# Patient Record
(demographics unavailable — no encounter records)

---

## 2024-11-20 NOTE — HISTORY OF PRESENT ILLNESS
[de-identified] : Ms. NICOLE BONNER is a 56 year old woman presents today for post-op appointment s/p right reverse total shoulder arthroplasty on 11/3/24. Since her surgery she states she is feeling

## 2024-11-20 NOTE — DISCUSSION/SUMMARY
[de-identified] : 56-year-old woman s/p right reverse total shoulder arthroplasty, approximately 2.5 weeks out.  -X-ray and physical exam findings were discussed with the patient -WES right UE -Sling for comfort -Physical therapy: as per protocol  -Follow up in 4 weeks with x-rays of the right shoulder at that time. -All the patient's questions and concerns were addressed during this visit

## 2024-11-20 NOTE — DISCUSSION/SUMMARY
[de-identified] : 56-year-old woman s/p right reverse total shoulder arthroplasty, approximately 2.5 weeks out.  -X-ray and physical exam findings were discussed with the patient -WES right UE -Sling for comfort -Physical therapy: as per protocol  -Follow up in 4 weeks with x-rays of the right shoulder at that time. -All the patient's questions and concerns were addressed during this visit

## 2024-11-20 NOTE — PHYSICAL EXAM
[de-identified] : Ms. NICOLE BONNER is sitting comfortably in the exam room  RIGHT shoulder -Skin is intact, no swelling, no ecchymosis -Incision is clean and dry, no erythema, no signs of infection -FE: , ER: , IR: , ABD: -Able to make a fist -Sensation is intact median, radial, ulnar, axillary nerves -Motor is intact median, radial, ulnar, axillary nerves -Hand is warm and well-perfused, Palpable radial and ulnar pulses  [de-identified] :  X-rays of the right shoulder were taken in the office today, 11/21/24. AP, Scap Y, Axillary.

## 2024-11-20 NOTE — REASON FOR VISIT
[Post Operative Visit] : a post operative visit for [FreeTextEntry2] : s/p Right shoulder reverse arthroplasty, DOS: 11/3/24

## 2024-11-20 NOTE — HISTORY OF PRESENT ILLNESS
[de-identified] : Ms. NICOLE BONNER is a 56 year old woman presents today for post-op appointment s/p right reverse total shoulder arthroplasty on 11/3/24. Since her surgery she states she is feeling

## 2024-11-20 NOTE — PHYSICAL EXAM
[de-identified] : Ms. NICOLE BONNER is sitting comfortably in the exam room  RIGHT shoulder -Skin is intact, no swelling, no ecchymosis -Incision is clean and dry, no erythema, no signs of infection -FE: , ER: , IR: , ABD: -Able to make a fist -Sensation is intact median, radial, ulnar, axillary nerves -Motor is intact median, radial, ulnar, axillary nerves -Hand is warm and well-perfused, Palpable radial and ulnar pulses  [de-identified] :  X-rays of the right shoulder were taken in the office today, 11/21/24. AP, Scap Y, Axillary.

## 2025-01-02 NOTE — HISTORY OF PRESENT ILLNESS
[de-identified] : Ms. NICOLE BONNER is a 56 year old woman presents today for post-op appointment s/p right reverse total shoulder arthroplasty on 11/3/24. Since her last visit she states she is feeling better. She has been participating in PT twice a week.

## 2025-01-02 NOTE — PHYSICAL EXAM
[de-identified] : Ms. NICOLE BONNER is sitting comfortably in the exam room  RIGHT shoulder -Skin is intact, no swelling,no ecchymosis -Incision is well-healed, no erythema, no signs of infection -Passive FE:60 , ER:5 , IR: Hip , Passive ABD: 30 -Able to make a fist -Sensation is grossly intact median, radial, ulnar, axillary nerves -Motor is intact median, radial, ulnar, axillary nerves -Hand is warm and well-perfused, Palpable radial and ulnar pulses  [de-identified] :  X-rays of the right shoulder were taken in the office today, 1/2/25. AP, Scap Y, Axillary.

## 2025-01-02 NOTE — DISCUSSION/SUMMARY
[de-identified] : 56-year-old woman s/p right reverse total shoulder arthroplasty, approximately 8.5 weeks out.  -X-ray and physical exam findings were discussed with the patient -NWB right UE -Physical therapy: as per protocol  -Mobic, Flexeril and Gabapentin for pain  -EMG to assess nerve conduction -Follow up in 6 weeks with x-rays of the right shoulder at that time. -All the patient's questions and concerns were addressed during this visit

## 2025-02-13 NOTE — HISTORY OF PRESENT ILLNESS
[de-identified] : Ms. NICOLE BONNER is a 56 year old woman presents today for follow up appointment s/p right reverse total shoulder arthroplasty on 11/3/24 after a right shoulder fracture dislocation with a brachial plexopathy. Since her last visit she states she is feeling the same. She notes pain at the right shoulder and a burning sensation to the right hand and lateral aspect of arm. She is taking Mobic, Tylenol and Gabapentin for the pain with mild relief.

## 2025-02-13 NOTE — REASON FOR VISIT
[Follow-Up Visit] : a follow-up visit for [FreeTextEntry2] : s/p Right shoulder reverse arthroplasty, DOS: 11/3/24.

## 2025-02-13 NOTE — PHYSICAL EXAM
[de-identified] : Ms. NICOLE BONNER is sitting comfortably in the exam room RIGHT shoulder -Skin is intact, there is atrophy of the deltoid compared to the other side, no ecchymosis -Incision is well-healed, no erythema, no signs of infection -Passive FE: 110 , ER:10 , IR: Hip , ABD: 90 -Able to make a fist -Sensation is grossly intact median, radial, ulnar, axillary nerves -Motor is intact median, radial, ulnar, axillary nerves -Hand is warm and well-perfused, Palpable radial and ulnar pulses [de-identified] : X-rays of the right shoulder were taken in the office today, 2/13/25. AP, Scap Y, Axillary. X-rays show good overall alignment of the shoulder. The patient is status post reverse total shoulder arthroplasty. Implants are in good position. No lucencies around the screws.

## 2025-02-13 NOTE — HISTORY OF PRESENT ILLNESS
[de-identified] : Ms. NICOLE BONNER is a 56 year old woman presents today for follow up appointment s/p right reverse total shoulder arthroplasty on 11/3/24 after a right shoulder fracture dislocation with a brachial plexopathy. Since her last visit she states she is feeling the same. She notes pain at the right shoulder and a burning sensation to the right hand and lateral aspect of arm. She is taking Mobic, Tylenol and Gabapentin for the pain with mild relief.

## 2025-02-13 NOTE — PHYSICAL EXAM
[de-identified] : Ms. NICOLE BONNER is sitting comfortably in the exam room RIGHT shoulder -Skin is intact, there is atrophy of the deltoid compared to the other side, no ecchymosis -Incision is well-healed, no erythema, no signs of infection -Passive FE: 110 , ER:10 , IR: Hip , ABD: 90 -Able to make a fist -Sensation is grossly intact median, radial, ulnar, axillary nerves -Motor is intact median, radial, ulnar, axillary nerves -Hand is warm and well-perfused, Palpable radial and ulnar pulses [de-identified] : X-rays of the right shoulder were taken in the office today, 2/13/25. AP, Scap Y, Axillary. X-rays show good overall alignment of the shoulder. The patient is status post reverse total shoulder arthroplasty. Implants are in good position. No lucencies around the screws.

## 2025-02-13 NOTE — DISCUSSION/SUMMARY
[de-identified] : 56-year-old woman s/p right reverse total shoulder arthroplasty, approximately 3 months out  -X-ray and physical exam findings were discussed with the patient.  The patient's initial injury involved the humeral head dislocating medially and anteriorly into the axilla around the brachial plexus.  The patient had some numbness and tingling initially throughout the hand.  She now has some neuropathic pain which could potentially be a sign of the nerve recovering.  She also has some atrophy at the deltoid.   -It is absolutely imperative that we get an EMG to assess the brachial plexus recovery and function of the patient's arm and shoulder.  -Getting physical therapy for the patient is absolutely necessary to optimize her recovery as well. -WBAT right UE -Physical therapy: as per protocol -Mobic, Flexeril and Gabapentin for pain -Follow up after the EMG -All the patient's questions and concerns were addressed during this visit.

## 2025-02-13 NOTE — DISCUSSION/SUMMARY
[de-identified] : 56-year-old woman s/p right reverse total shoulder arthroplasty, approximately 3 months out  -X-ray and physical exam findings were discussed with the patient.  The patient's initial injury involved the humeral head dislocating medially and anteriorly into the axilla around the brachial plexus.  The patient had some numbness and tingling initially throughout the hand.  She now has some neuropathic pain which could potentially be a sign of the nerve recovering.  She also has some atrophy at the deltoid.   -It is absolutely imperative that we get an EMG to assess the brachial plexus recovery and function of the patient's arm and shoulder.  -Getting physical therapy for the patient is absolutely necessary to optimize her recovery as well. -WBAT right UE -Physical therapy: as per protocol -Mobic, Flexeril and Gabapentin for pain -Follow up after the EMG -All the patient's questions and concerns were addressed during this visit.

## 2025-04-10 NOTE — PHYSICAL EXAM
[de-identified] : Ms. NICOLE BONNER is sitting comfortably in the exam room RIGHT shoulder -Skin is intact, there is atrophy of the deltoid compared to the other side, no ecchymosis -Incision is well-healed, no erythema, no signs of infection -Passive FE: 110 , ER:10 , IR: Hip , ABD: 90 -Able to make a fist -Sensation is grossly intact median, radial, ulnar, axillary nerves -Motor is intact median, radial, ulnar, axillary nerves -Hand is warm and well-perfused, Palpable radial and ulnar pulses [de-identified] : X-rays of the right shoulder were taken in the office today, 4/10/25. AP, Scap Y, Axillary. X-rays show good overall alignment of the shoulder. The patient is status post reverse total shoulder arthroplasty. Implants are in good position. No lucencies around the screws.

## 2025-04-10 NOTE — PHYSICAL EXAM
[de-identified] : Ms. NICOLE BONNER is sitting comfortably in the exam room RIGHT shoulder -Skin is intact, there is atrophy of the deltoid compared to the other side, no ecchymosis -Incision is well-healed, no erythema, no signs of infection -Passive FE: 110 , ER:10 , IR: Hip , ABD: 90 -Able to make a fist -Sensation is grossly intact median, radial, ulnar, axillary nerves -Motor is intact median, radial, ulnar, axillary nerves -Hand is warm and well-perfused, Palpable radial and ulnar pulses [de-identified] : X-rays of the right shoulder were taken in the office today, 4/10/25. AP, Scap Y, Axillary. X-rays show good overall alignment of the shoulder. The patient is status post reverse total shoulder arthroplasty. Implants are in good position. No lucencies around the screws.

## 2025-04-10 NOTE — DISCUSSION/SUMMARY
[de-identified] : 57-year-old woman s/p right reverse total shoulder arthroplasty, approximately 5 months out  -X-ray and physical exam findings were discussed with the patient and her . She has not had the EMG at this point, and it is absolutely imperative that we get an EMG to assess the brachial plexus recovery and function of the patient's arm and shoulder.  -WBAT right UE -Physical therapy: as per protocol -Mobic, Flexeril and Gabapentin for pain -Follow up in 2 months with x-rays of the right shoulder at that time.  -All the patient's questions and concerns were addressed during this visit.

## 2025-04-10 NOTE — DISCUSSION/SUMMARY
[de-identified] : 57-year-old woman s/p right reverse total shoulder arthroplasty, approximately 5 months out  -X-ray and physical exam findings were discussed with the patient and her . She has not had the EMG at this point, and it is absolutely imperative that we get an EMG to assess the brachial plexus recovery and function of the patient's arm and shoulder.  -WBAT right UE -Physical therapy: as per protocol -Mobic, Flexeril and Gabapentin for pain -Follow up in 2 months with x-rays of the right shoulder at that time.  -All the patient's questions and concerns were addressed during this visit.

## 2025-04-10 NOTE — HISTORY OF PRESENT ILLNESS
[Has the patient missed work because of the injury/illness?] : The patient has missed work because of the injury/illness. [No] : The patient is currently not working. [de-identified] : Ms. NICOLE BONNER is a 57 year old woman presents today for follow up appointment s/p right reverse total shoulder arthroplasty on 11/3/24 after a right shoulder fracture dislocation with a brachial plexopathy. Since her last visit she states she is feeling better. She was seen by a neurologist, Dr. Balta Robert, who referred the patient for an MRI. She also numbness at the deltoid and the first 3 fingers of the right hand. She is participating in PT three times a week.  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

## 2025-04-10 NOTE — HISTORY OF PRESENT ILLNESS
[Has the patient missed work because of the injury/illness?] : The patient has missed work because of the injury/illness. [No] : The patient is currently not working. [de-identified] : Ms. NICOLE BONNER is a 57 year old woman presents today for follow up appointment s/p right reverse total shoulder arthroplasty on 11/3/24 after a right shoulder fracture dislocation with a brachial plexopathy. Since her last visit she states she is feeling better. She was seen by a neurologist, Dr. Balta Robert, who referred the patient for an MRI. She also numbness at the deltoid and the first 3 fingers of the right hand. She is participating in PT three times a week.  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

## 2025-06-11 NOTE — PHYSICAL EXAM
[de-identified] : Ms. NICOLE BONNER is sitting comfortably in the exam room RIGHT shoulder -Skin is intact, there is atrophy of the deltoid compared to the other side, no ecchymosis -Incision is well-healed, no erythema, no signs of infection -FE: 110 , ER:10 , IR: Hip , ABD: 90 -Able to make a fist -Sensation is grossly intact median, radial, ulnar, axillary nerves -Motor is intact median, radial, ulnar, axillary nerves -Hand is warm and well-perfused, Palpable radial and ulnar pulses [de-identified] : X-rays of the right shoulder were taken in the office today, 6/11/25. AP, Scap Y, Axillary. X-rays show good overall alignment of the shoulder. The patient is status post reverse total shoulder arthroplasty. Implants are in good position. No lucencies around the screws.

## 2025-06-11 NOTE — DISCUSSION/SUMMARY
[de-identified] : 57-year-old woman s/p right reverse total shoulder arthroplasty, approximately 7 months out  -X-ray and physical exam findings were discussed with the patient and her .  -WBAT right UE -Physical therapy: as per protocol -Mobic, Flexeril and Gabapentin for pain -Follow up in 5 months with x-rays of the right shoulder at that time.  -All the patient's questions and concerns were addressed during this visit.

## 2025-06-11 NOTE — PHYSICAL EXAM
[de-identified] : Ms. NICOLE BONNER is sitting comfortably in the exam room RIGHT shoulder -Skin is intact, there is atrophy of the deltoid compared to the other side, no ecchymosis -Incision is well-healed, no erythema, no signs of infection -FE: 110 , ER:10 , IR: Hip , ABD: 90 -Able to make a fist -Sensation is grossly intact median, radial, ulnar, axillary nerves -Motor is intact median, radial, ulnar, axillary nerves -Hand is warm and well-perfused, Palpable radial and ulnar pulses [de-identified] : X-rays of the right shoulder were taken in the office today, 6/11/25. AP, Scap Y, Axillary. X-rays show good overall alignment of the shoulder. The patient is status post reverse total shoulder arthroplasty. Implants are in good position. No lucencies around the screws.

## 2025-06-11 NOTE — HISTORY OF PRESENT ILLNESS
[Has the patient missed work because of the injury/illness?] : The patient has missed work because of the injury/illness. [No] : The patient is currently not working. [de-identified] : Ms. NICOLE BONNER is a 57 year old woman presents today for follow up appointment s/p right reverse total shoulder arthroplasty on 11/3/24 after a right shoulder fracture dislocation with a brachial plexopathy. Since her last visit she states she is feeling better. She notes tingling at the right wrist at the radial aspect. She had a JOSE R by [facility] on 5/6/25 which did not provide relief, and she states she had a bad reaction to it. She is taking Tylenol and Mobic for the pain with relief.    She was seen by a neurologist, Dr. Balta Robert on 3/29/25 and had an EMG of the right UE.  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

## 2025-06-11 NOTE — HISTORY OF PRESENT ILLNESS
[Has the patient missed work because of the injury/illness?] : The patient has missed work because of the injury/illness. [No] : The patient is currently not working. [de-identified] : Ms. NICOLE BONNER is a 57 year old woman presents today for follow up appointment s/p right reverse total shoulder arthroplasty on 11/3/24 after a right shoulder fracture dislocation with a brachial plexopathy. Since her last visit she states she is feeling better. She notes tingling at the right wrist at the radial aspect. She had a JOSE R by [facility] on 5/6/25 which did not provide relief, and she states she had a bad reaction to it. She is taking Tylenol and Mobic for the pain with relief.    She was seen by a neurologist, Dr. Balta Robert on 3/29/25 and had an EMG of the right UE.  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

## 2025-06-11 NOTE — DISCUSSION/SUMMARY
[de-identified] : 57-year-old woman s/p right reverse total shoulder arthroplasty, approximately 7 months out  -X-ray and physical exam findings were discussed with the patient and her .  -WBAT right UE -Physical therapy: as per protocol -Mobic, Flexeril and Gabapentin for pain -Follow up in 5 months with x-rays of the right shoulder at that time.  -All the patient's questions and concerns were addressed during this visit.